# Patient Record
Sex: FEMALE | URBAN - METROPOLITAN AREA
[De-identification: names, ages, dates, MRNs, and addresses within clinical notes are randomized per-mention and may not be internally consistent; named-entity substitution may affect disease eponyms.]

---

## 2024-03-11 ENCOUNTER — APPOINTMENT (RX ONLY)
Dept: URBAN - METROPOLITAN AREA CLINIC 17 | Facility: CLINIC | Age: 44
Setting detail: DERMATOLOGY
End: 2024-03-11

## 2024-03-11 DIAGNOSIS — L64.8 OTHER ANDROGENIC ALOPECIA: ICD-10-CM | Status: INADEQUATELY CONTROLLED

## 2024-03-11 DIAGNOSIS — L65.0 TELOGEN EFFLUVIUM: ICD-10-CM

## 2024-03-11 PROCEDURE — ? TREATMENT REGIMEN

## 2024-03-11 PROCEDURE — 99204 OFFICE O/P NEW MOD 45 MIN: CPT

## 2024-03-11 PROCEDURE — ? PRESCRIPTION

## 2024-03-11 PROCEDURE — ? COUNSELING

## 2024-03-11 RX ORDER — MINOXIDIL 2.5 MG/1
1 TABLET ORAL QD
Qty: 15 | Refills: 6 | Status: ERX | COMMUNITY
Start: 2024-03-11

## 2024-03-11 RX ADMIN — MINOXIDIL 1: 2.5 TABLET ORAL at 00:00

## 2024-03-11 ASSESSMENT — LOCATION ZONE DERM: LOCATION ZONE: SCALP

## 2024-03-11 ASSESSMENT — LOCATION DETAILED DESCRIPTION DERM
LOCATION DETAILED: LEFT CENTRAL PARIETAL SCALP
LOCATION DETAILED: RIGHT OCCIPITAL SCALP
LOCATION DETAILED: POSTERIOR MID-PARIETAL SCALP

## 2024-03-11 ASSESSMENT — LOCATION SIMPLE DESCRIPTION DERM
LOCATION SIMPLE: POSTERIOR SCALP
LOCATION SIMPLE: SCALP

## 2024-03-11 ASSESSMENT — SEVERITY OF ALOPECIA TOOL: % SCALP HAIR LOST: 33

## 2024-03-11 NOTE — HPI: HAIR LOSS
How Did The Hair Loss Occur?: gradual in onset
How Severe Is Your Hair Loss?: moderate
What Hair Products Do You Use?: Nioxin shampoo and conditioner.
Additional History: The patient notes she noted marked shedding after jesse COVID. She said the shedding improved and she felt the process was reversing; however, she noted additional shedding without noted trigger. 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 She has been modifying her exercise and diet but denies extreme weight loss or extreme diet changes. She reports she has recently had her thyroid, CBC, and Iron panels evaluated by her PCP and these were found to be WNL. She was started on Wellbutrin and trazadone more than 2 years prior to the onset of hairloss. She notes her brother has marked thinning. She denies itching or burning of the scalp.

## 2024-03-11 NOTE — PROCEDURE: COUNSELING
Detail Level: Zone
Minoxidil 5% Topical Foam Recommendations: Apply once to twice daily for 9 -12 months to determine effectiveness.

## 2024-03-11 NOTE — PROCEDURE: TREATMENT REGIMEN
Plan: Discussed Minoxidil & its side effects.\\nDiscussed Finasteride & its side effects.\\nDiscussed trying Minoxidil first, and if no side effects and if no improvement after 6 months, then we can add Finasteride. Instructed pt to call for either more refills of Minoxidil & initiate Finasteride, or just Minoxidil refills depending on pt’s status.
Detail Level: Detailed

## 2024-04-22 ENCOUNTER — RX ONLY (OUTPATIENT)
Age: 44
Setting detail: RX ONLY
End: 2024-04-22

## 2024-04-22 RX ORDER — MINOXIDIL 2.5 MG/1
1 TABLET ORAL QD
Qty: 30 | Refills: 11 | Status: ERX

## 2024-04-22 RX ORDER — FINASTERIDE 5 MG/1
1 TABLET, FILM COATED ORAL QD
Qty: 15 | Refills: 11 | Status: ERX | COMMUNITY
Start: 2024-04-22